# Patient Record
Sex: FEMALE | Race: WHITE | NOT HISPANIC OR LATINO | URBAN - METROPOLITAN AREA
[De-identification: names, ages, dates, MRNs, and addresses within clinical notes are randomized per-mention and may not be internally consistent; named-entity substitution may affect disease eponyms.]

---

## 2018-09-18 ENCOUNTER — OFFICE (OUTPATIENT)
Dept: URBAN - METROPOLITAN AREA CLINIC 19 | Facility: CLINIC | Age: 58
End: 2018-09-18

## 2018-09-18 VITALS
SYSTOLIC BLOOD PRESSURE: 109 MMHG | HEIGHT: 66 IN | WEIGHT: 202 LBS | DIASTOLIC BLOOD PRESSURE: 74 MMHG | HEART RATE: 58 BPM

## 2018-09-18 DIAGNOSIS — R10.812 LEFT UPPER QUADRANT ABDOMINAL TENDERNESS: ICD-10-CM

## 2018-09-18 DIAGNOSIS — E66.9 OBESITY, UNSPECIFIED: ICD-10-CM

## 2018-09-18 DIAGNOSIS — K21.9 GASTRO-ESOPHAGEAL REFLUX DISEASE WITHOUT ESOPHAGITIS: ICD-10-CM

## 2018-09-18 DIAGNOSIS — K59.00 CONSTIPATION, UNSPECIFIED: ICD-10-CM

## 2018-09-18 DIAGNOSIS — R05 COUGH: ICD-10-CM

## 2018-09-18 DIAGNOSIS — R14.0 ABDOMINAL DISTENSION (GASEOUS): ICD-10-CM

## 2018-09-18 DIAGNOSIS — R11.2 NAUSEA WITH VOMITING, UNSPECIFIED: ICD-10-CM

## 2018-09-18 LAB
AMYLASE: 25 U/L — LOW (ref 31–124)
C-REACTIVE PROTEIN, QUANT: 11.4 MG/L — HIGH (ref 0–4.9)
CBC, PLATELET, NO DIFFERENTIAL: HEMATOCRIT: 36.5 % (ref 34–46.6)
CBC, PLATELET, NO DIFFERENTIAL: HEMOGLOBIN: 12.1 G/DL (ref 11.1–15.9)
CBC, PLATELET, NO DIFFERENTIAL: MCH: 27.9 PG (ref 26.6–33)
CBC, PLATELET, NO DIFFERENTIAL: MCHC: 33.2 G/DL (ref 31.5–35.7)
CBC, PLATELET, NO DIFFERENTIAL: MCV: 84 FL (ref 79–97)
CBC, PLATELET, NO DIFFERENTIAL: PLATELETS: 278 X10E3/UL (ref 150–379)
CBC, PLATELET, NO DIFFERENTIAL: RBC: 4.34 X10E6/UL (ref 3.77–5.28)
CBC, PLATELET, NO DIFFERENTIAL: RDW: 14.7 % (ref 12.3–15.4)
CBC, PLATELET, NO DIFFERENTIAL: WBC: 7.8 X10E3/UL (ref 3.4–10.8)
CELIAC DISEASE COMPREHENSIVE: DEAMIDATED GLIADIN ABS, IGA: 4 UNITS (ref 0–19)
CELIAC DISEASE COMPREHENSIVE: DEAMIDATED GLIADIN ABS, IGG: 2 UNITS (ref 0–19)
CELIAC DISEASE COMPREHENSIVE: ENDOMYSIAL ANTIBODY IGA: NEGATIVE
CELIAC DISEASE COMPREHENSIVE: IMMUNOGLOBULIN A, QN, SERUM: 216 MG/DL (ref 87–352)
CELIAC DISEASE COMPREHENSIVE: T-TRANSGLUTAMINASE (TTG) IGA: 3 U/ML (ref 0–3)
CELIAC DISEASE COMPREHENSIVE: T-TRANSGLUTAMINASE (TTG) IGG: <2 U/ML
COMP. METABOLIC PANEL (14): A/G RATIO: 1.7 (ref 1.2–2.2)
COMP. METABOLIC PANEL (14): ALBUMIN: 4.3 G/DL (ref 3.5–5.5)
COMP. METABOLIC PANEL (14): ALKALINE PHOSPHATASE: 81 IU/L (ref 39–117)
COMP. METABOLIC PANEL (14): ALT (SGPT): 27 IU/L (ref 0–32)
COMP. METABOLIC PANEL (14): AST (SGOT): 24 IU/L (ref 0–40)
COMP. METABOLIC PANEL (14): BILIRUBIN, TOTAL: 0.2 MG/DL (ref 0–1.2)
COMP. METABOLIC PANEL (14): BUN/CREATININE RATIO: 17 (ref 9–23)
COMP. METABOLIC PANEL (14): BUN: 15 MG/DL (ref 6–24)
COMP. METABOLIC PANEL (14): CALCIUM: 10.6 MG/DL — HIGH (ref 8.7–10.2)
COMP. METABOLIC PANEL (14): CARBON DIOXIDE, TOTAL: 22 MMOL/L (ref 20–29)
COMP. METABOLIC PANEL (14): CHLORIDE: 104 MMOL/L (ref 96–106)
COMP. METABOLIC PANEL (14): CREATININE: 0.86 MG/DL (ref 0.57–1)
COMP. METABOLIC PANEL (14): EGFR IF AFRICN AM: 87 ML/MIN/1.73 (ref 59–?)
COMP. METABOLIC PANEL (14): EGFR IF NONAFRICN AM: 76 ML/MIN/1.73 (ref 59–?)
COMP. METABOLIC PANEL (14): GLOBULIN, TOTAL: 2.6 G/DL (ref 1.5–4.5)
COMP. METABOLIC PANEL (14): GLUCOSE: 96 MG/DL (ref 65–99)
COMP. METABOLIC PANEL (14): POTASSIUM: 4.4 MMOL/L (ref 3.5–5.2)
COMP. METABOLIC PANEL (14): PROTEIN, TOTAL: 6.9 G/DL (ref 6–8.5)
COMP. METABOLIC PANEL (14): SODIUM: 142 MMOL/L (ref 134–144)
FOOD ALLERGY PROFILE: CLASS DESCRIPTION: (no result)
FOOD ALLERGY PROFILE: F001-IGE EGG WHITE: <0.1 KU/L
FOOD ALLERGY PROFILE: F002-IGE MILK: <0.1 KU/L
FOOD ALLERGY PROFILE: F003-IGE CODFISH: <0.1 KU/L
FOOD ALLERGY PROFILE: F004-IGE WHEAT: <0.1 KU/L
FOOD ALLERGY PROFILE: F008-IGE CORN: <0.1 KU/L
FOOD ALLERGY PROFILE: F010-IGE SESAME SEED: <0.1 KU/L
FOOD ALLERGY PROFILE: F013-IGE PEANUT: <0.1 KU/L
FOOD ALLERGY PROFILE: F014-IGE SOYBEAN: <0.1 KU/L
FOOD ALLERGY PROFILE: F024-IGE SHRIMP: <0.1 KU/L
FOOD ALLERGY PROFILE: F207-IGE CLAM: <0.1 KU/L
FOOD ALLERGY PROFILE: F256-IGE WALNUT: <0.1 KU/L
FOOD ALLERGY PROFILE: F338-IGE SCALLOP: <0.1 KU/L
LIPASE: 15 U/L (ref 14–72)

## 2018-09-18 PROCEDURE — 99204 OFFICE O/P NEW MOD 45 MIN: CPT | Performed by: INTERNAL MEDICINE

## 2018-09-18 RX ORDER — RANITIDINE 150 MG/1
TABLET ORAL
Qty: 60 | Refills: 1 | Status: COMPLETED
Start: 2018-09-18 | End: 2018-10-15

## 2018-10-09 ENCOUNTER — OFFICE (OUTPATIENT)
Dept: URBAN - METROPOLITAN AREA PATHOLOGY 22 | Facility: PATHOLOGY | Age: 58
End: 2018-10-09
Payer: COMMERCIAL

## 2018-10-09 ENCOUNTER — AMBULATORY SURGICAL CENTER (OUTPATIENT)
Dept: URBAN - METROPOLITAN AREA SURGERY 2 | Facility: SURGERY | Age: 58
End: 2018-10-09

## 2018-10-09 ENCOUNTER — AMBULATORY SURGICAL CENTER (OUTPATIENT)
Dept: URBAN - METROPOLITAN AREA SURGERY 2 | Facility: SURGERY | Age: 58
End: 2018-10-09
Payer: COMMERCIAL

## 2018-10-09 VITALS
HEART RATE: 53 BPM | HEART RATE: 59 BPM | SYSTOLIC BLOOD PRESSURE: 92 MMHG | HEIGHT: 66 IN | RESPIRATION RATE: 16 BRPM | DIASTOLIC BLOOD PRESSURE: 57 MMHG | HEART RATE: 56 BPM | DIASTOLIC BLOOD PRESSURE: 61 MMHG | HEIGHT: 66 IN | TEMPERATURE: 97.6 F | TEMPERATURE: 98 F | HEART RATE: 56 BPM | DIASTOLIC BLOOD PRESSURE: 53 MMHG | OXYGEN SATURATION: 97 % | DIASTOLIC BLOOD PRESSURE: 57 MMHG | HEART RATE: 51 BPM | SYSTOLIC BLOOD PRESSURE: 92 MMHG | DIASTOLIC BLOOD PRESSURE: 45 MMHG | RESPIRATION RATE: 18 BRPM | DIASTOLIC BLOOD PRESSURE: 53 MMHG | OXYGEN SATURATION: 96 % | OXYGEN SATURATION: 97 % | SYSTOLIC BLOOD PRESSURE: 99 MMHG | WEIGHT: 200 LBS | WEIGHT: 200 LBS | DIASTOLIC BLOOD PRESSURE: 61 MMHG | SYSTOLIC BLOOD PRESSURE: 95 MMHG | HEART RATE: 59 BPM | TEMPERATURE: 97.6 F | SYSTOLIC BLOOD PRESSURE: 99 MMHG | OXYGEN SATURATION: 96 % | HEART RATE: 51 BPM | DIASTOLIC BLOOD PRESSURE: 45 MMHG | TEMPERATURE: 98 F | RESPIRATION RATE: 16 BRPM | SYSTOLIC BLOOD PRESSURE: 95 MMHG | RESPIRATION RATE: 18 BRPM | HEART RATE: 53 BPM

## 2018-10-09 DIAGNOSIS — K22.8 OTHER SPECIFIED DISEASES OF ESOPHAGUS: ICD-10-CM

## 2018-10-09 DIAGNOSIS — R11.2 NAUSEA WITH VOMITING, UNSPECIFIED: ICD-10-CM

## 2018-10-09 DIAGNOSIS — K21.0 GASTRO-ESOPHAGEAL REFLUX DISEASE WITH ESOPHAGITIS: ICD-10-CM

## 2018-10-09 DIAGNOSIS — K29.50 UNSPECIFIED CHRONIC GASTRITIS WITHOUT BLEEDING: ICD-10-CM

## 2018-10-09 DIAGNOSIS — K20.9 ESOPHAGITIS, UNSPECIFIED: ICD-10-CM

## 2018-10-09 DIAGNOSIS — K44.9 DIAPHRAGMATIC HERNIA WITHOUT OBSTRUCTION OR GANGRENE: ICD-10-CM

## 2018-10-09 DIAGNOSIS — K22.10 ULCER OF ESOPHAGUS WITHOUT BLEEDING: ICD-10-CM

## 2018-10-09 DIAGNOSIS — R14.0 ABDOMINAL DISTENSION (GASEOUS): ICD-10-CM

## 2018-10-09 PROBLEM — K31.89 OTHER DISEASES OF STOMACH AND DUODENUM: Status: ACTIVE | Noted: 2018-10-09

## 2018-10-09 PROCEDURE — 88342 IMHCHEM/IMCYTCHM 1ST ANTB: CPT | Performed by: INTERNAL MEDICINE

## 2018-10-09 PROCEDURE — 88341 IMHCHEM/IMCYTCHM EA ADD ANTB: CPT | Performed by: INTERNAL MEDICINE

## 2018-10-09 PROCEDURE — 88312 SPECIAL STAINS GROUP 1: CPT | Performed by: INTERNAL MEDICINE

## 2018-10-09 PROCEDURE — 43239 EGD BIOPSY SINGLE/MULTIPLE: CPT | Performed by: INTERNAL MEDICINE

## 2018-10-09 PROCEDURE — 88305 TISSUE EXAM BY PATHOLOGIST: CPT | Performed by: INTERNAL MEDICINE

## 2018-10-09 PROCEDURE — 88313 SPECIAL STAINS GROUP 2: CPT | Performed by: INTERNAL MEDICINE

## 2019-02-12 ENCOUNTER — OFFICE (OUTPATIENT)
Dept: URBAN - METROPOLITAN AREA CLINIC 19 | Facility: CLINIC | Age: 59
End: 2019-02-12

## 2019-02-12 VITALS
DIASTOLIC BLOOD PRESSURE: 75 MMHG | WEIGHT: 200 LBS | HEIGHT: 66 IN | SYSTOLIC BLOOD PRESSURE: 111 MMHG | HEART RATE: 68 BPM

## 2019-02-12 DIAGNOSIS — K44.9 DIAPHRAGMATIC HERNIA WITHOUT OBSTRUCTION OR GANGRENE: ICD-10-CM

## 2019-02-12 DIAGNOSIS — K21.9 GASTRO-ESOPHAGEAL REFLUX DISEASE WITHOUT ESOPHAGITIS: ICD-10-CM

## 2019-02-12 DIAGNOSIS — K20.9 ESOPHAGITIS, UNSPECIFIED: ICD-10-CM

## 2019-02-12 DIAGNOSIS — R11.0 NAUSEA: ICD-10-CM

## 2019-02-12 DIAGNOSIS — R14.0 ABDOMINAL DISTENSION (GASEOUS): ICD-10-CM

## 2019-02-12 DIAGNOSIS — K59.00 CONSTIPATION, UNSPECIFIED: ICD-10-CM

## 2019-02-12 PROCEDURE — 99214 OFFICE O/P EST MOD 30 MIN: CPT | Performed by: INTERNAL MEDICINE

## 2019-02-12 RX ORDER — PANTOPRAZOLE SODIUM 20 MG/1
TABLET, DELAYED RELEASE ORAL
Qty: 60 | Refills: 3 | Status: COMPLETED
Start: 2018-10-15 | End: 2019-02-12

## 2019-02-12 RX ORDER — OMEPRAZOLE 40 MG/1
CAPSULE, DELAYED RELEASE ORAL
Qty: 60 | Refills: 5 | Status: ACTIVE
Start: 2019-02-12

## 2019-03-25 ENCOUNTER — OFFICE (OUTPATIENT)
Dept: URBAN - METROPOLITAN AREA CLINIC 14 | Facility: CLINIC | Age: 59
End: 2019-03-25

## 2019-03-25 VITALS
SYSTOLIC BLOOD PRESSURE: 113 MMHG | HEART RATE: 63 BPM | DIASTOLIC BLOOD PRESSURE: 65 MMHG | WEIGHT: 203 LBS | HEIGHT: 66 IN

## 2019-03-25 DIAGNOSIS — K44.9 DIAPHRAGMATIC HERNIA WITHOUT OBSTRUCTION OR GANGRENE: ICD-10-CM

## 2019-03-25 DIAGNOSIS — R63.5 ABNORMAL WEIGHT GAIN: ICD-10-CM

## 2019-03-25 DIAGNOSIS — K21.9 GASTRO-ESOPHAGEAL REFLUX DISEASE WITHOUT ESOPHAGITIS: ICD-10-CM

## 2019-03-25 PROCEDURE — 99214 OFFICE O/P EST MOD 30 MIN: CPT | Performed by: INTERNAL MEDICINE

## 2019-03-25 RX ORDER — RANITIDINE 150 MG/1
300 TABLET ORAL
Qty: 60 | Refills: 5 | Status: COMPLETED
Start: 2019-03-25 | End: 2021-06-14

## 2021-06-14 ENCOUNTER — OFFICE (OUTPATIENT)
Dept: URBAN - METROPOLITAN AREA CLINIC 19 | Facility: CLINIC | Age: 61
End: 2021-06-14

## 2021-06-14 VITALS
HEIGHT: 66 IN | SYSTOLIC BLOOD PRESSURE: 119 MMHG | WEIGHT: 203 LBS | DIASTOLIC BLOOD PRESSURE: 74 MMHG | HEART RATE: 65 BPM | OXYGEN SATURATION: 98 %

## 2021-06-14 DIAGNOSIS — K62.5 HEMORRHAGE OF ANUS AND RECTUM: ICD-10-CM

## 2021-06-14 DIAGNOSIS — K62.89 OTHER SPECIFIED DISEASES OF ANUS AND RECTUM: ICD-10-CM

## 2021-06-14 DIAGNOSIS — R10.13 EPIGASTRIC PAIN: ICD-10-CM

## 2021-06-14 DIAGNOSIS — K64.9 UNSPECIFIED HEMORRHOIDS: ICD-10-CM

## 2021-06-14 DIAGNOSIS — Z83.71 FAMILY HISTORY OF COLONIC POLYPS: ICD-10-CM

## 2021-06-14 PROCEDURE — 99214 OFFICE O/P EST MOD 30 MIN: CPT

## 2021-06-14 RX ORDER — LIDOCAINE 5 G/100G
OINTMENT TOPICAL
Qty: 30 | Refills: 1 | Status: COMPLETED
End: 2022-05-31

## 2021-06-14 RX ORDER — HYDROCORTISONE ACETATE 25 MG/1
50 SUPPOSITORY RECTAL
Qty: 14 | Refills: 1 | Status: COMPLETED
End: 2022-05-31

## 2021-06-14 RX ORDER — SODIUM PICOSULFATE, MAGNESIUM OXIDE, AND ANHYDROUS CITRIC ACID 10; 3.5; 12 MG/160ML; G/160ML; G/160ML
LIQUID ORAL
Qty: 1 | Refills: 0 | Status: COMPLETED
Start: 2021-06-14 | End: 2021-08-09

## 2021-06-14 NOTE — SERVICEHPINOTES
60-year-old  white female previously seen by Lexie Stahl MD here for complaints of symptomatic hemorrhoids, rectal bleeding and rectal pain.She was last seen for chronic reflux and heartburn issues.  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  Biopsies were negative for H pylori and celiac disease. She tried multiple PPIs without significant relief.  However, she has changed her diet and utilizes anti reflux measures, especially not lying down 2-3 hours after eating.  She is actually doing well off any medication.Over the last several weeks she has had which she believes are symptomatic hemorrhoids with occasional rectal bleeding with bowel movements and rectal itching and pain.  Sometimes the rectal pain while actually wake her up from sleep at night.  She was evaluated by a provider on Tele[facility] provided by her insurance who gave her prescription for Anucort and lidocaine and encouraged her to follow-up with GI.  These have actually significantly helped her symptoms.  She denies any significant constipation, but recently started a probiotic which seems to keep her regular.  Occasionally, she will spend a longer period of time on the toilet bowl, but denies significant straining.  She denies significant reflux at this time, but does report epigastric pain whenever she tries to eat or drink anything cold.  She denies abdominal pain, nausea or vomiting.She last had a colonoscopy 8/23/11 which found hemorrhoids but was otherwise unremarkable.  Her daughter was apparently recently diagnosed with Crohn's disease at age 40.  Her mother had colon polyps at age 60.

## 2021-06-14 NOTE — SERVICENOTES
The patient's assessment was reviewed with Dr. Talobt and a collaborative plan of care was established.

## 2021-08-09 ENCOUNTER — OFFICE (OUTPATIENT)
Dept: URBAN - METROPOLITAN AREA CLINIC 19 | Facility: CLINIC | Age: 61
End: 2021-08-09

## 2021-08-09 VITALS
WEIGHT: 202 LBS | DIASTOLIC BLOOD PRESSURE: 78 MMHG | SYSTOLIC BLOOD PRESSURE: 116 MMHG | HEART RATE: 62 BPM | HEIGHT: 66 IN | OXYGEN SATURATION: 99 %

## 2021-08-09 DIAGNOSIS — R93.3 ABNORMAL FINDINGS ON DIAGNOSTIC IMAGING OF OTHER PARTS OF DI: ICD-10-CM

## 2021-08-09 DIAGNOSIS — R10.13 EPIGASTRIC PAIN: ICD-10-CM

## 2021-08-09 DIAGNOSIS — Z83.71 FAMILY HISTORY OF COLONIC POLYPS: ICD-10-CM

## 2021-08-09 DIAGNOSIS — K21.9 GASTRO-ESOPHAGEAL REFLUX DISEASE WITHOUT ESOPHAGITIS: ICD-10-CM

## 2021-08-09 DIAGNOSIS — K62.5 HEMORRHAGE OF ANUS AND RECTUM: ICD-10-CM

## 2021-08-09 DIAGNOSIS — R05 COUGH: ICD-10-CM

## 2021-08-09 PROCEDURE — 99214 OFFICE O/P EST MOD 30 MIN: CPT

## 2021-08-09 RX ORDER — FAMOTIDINE 40 MG/1
40 TABLET, FILM COATED ORAL
Qty: 30 | Refills: 11 | Status: COMPLETED
Start: 2021-08-09 | End: 2021-09-13

## 2021-08-09 NOTE — SERVICEHPINOTES
60-year-old  white female returns to discuss an abnormal barium swallow study and worsening reflux.I last saw her on 6/14/21 for complaints of symptomatic hemorrhoids, rectal bleeding and rectal pain. I gave her lidocaine ointment and Anucort suppositories for the symptoms.  I also ordered an upper and lower scope, she was due for screening colonoscopy and was also complaining of epigastric pain when she would eat or drink “cold things.”  She was off the PPI at that point as she has had issues with a mild rash whenever she takes omeprazole or other PPIs.Since being seen in June, her rectal bleeding and rectal pain has significantly improved with ointment and suppositories.  She only uses the suppositories as needed.  She has been evaluated by a pulmonologist, Lorenzo Springer MD for chronic cough that she has.  She apparently had a chest x-ray as well as a barium swallow study with Dr. Springer.  Apparently the barium swallow study was abnormal and an EGD was recommended (records requested).  She continues to have reflux symptoms, but denies taking any other antacids then Tums.  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 Biopsies were negative for H pylori and celiac disease. She has tried multiple PPIs without significant relief and reports experiencing a facial rash to Omeprazole. She does report epigastric pain whenever she tries to eat or drink anything cold. She denies any significant constipation, but recently started a probiotic which seems to keep her regular. Over the last several weeks she has had which she believes are symptomatic hemorrhoids with occasional rectal bleeding with bowel movements and rectal itching and pain. Sometimes the rectal pain while actually wake her up from sleep at night. She was evaluated by a provider on [employer] provided by her insurance who gave her prescription for Anucort and lidocaine and encouraged her to follow-up with GI. These have actually significantly helped her symptoms. She last had a colonoscopy 8/23/11 which found hemorrhoids but was otherwise unremarkable. Her daughter was apparently recently diagnosed with Crohn's disease at age 40. Her mother had colon polyps at age 60.

## 2021-08-09 NOTE — SERVICENOTES
The patient's assessment was reviewed with Dr. Talbot and a collaborative plan of care was established.

## 2021-09-13 ENCOUNTER — AMBULATORY SURGICAL CENTER (OUTPATIENT)
Dept: URBAN - METROPOLITAN AREA SURGERY 2 | Facility: SURGERY | Age: 61
End: 2021-09-13
Payer: COMMERCIAL

## 2021-09-13 ENCOUNTER — OFFICE (OUTPATIENT)
Dept: URBAN - METROPOLITAN AREA PATHOLOGY 22 | Facility: PATHOLOGY | Age: 61
End: 2021-09-13
Payer: COMMERCIAL

## 2021-09-13 VITALS
HEART RATE: 71 BPM | SYSTOLIC BLOOD PRESSURE: 118 MMHG | RESPIRATION RATE: 17 BRPM | HEART RATE: 71 BPM | RESPIRATION RATE: 17 BRPM | SYSTOLIC BLOOD PRESSURE: 119 MMHG | SYSTOLIC BLOOD PRESSURE: 108 MMHG | TEMPERATURE: 97.8 F | DIASTOLIC BLOOD PRESSURE: 59 MMHG | SYSTOLIC BLOOD PRESSURE: 129 MMHG | OXYGEN SATURATION: 93 % | DIASTOLIC BLOOD PRESSURE: 81 MMHG | TEMPERATURE: 97.8 F | OXYGEN SATURATION: 93 % | SYSTOLIC BLOOD PRESSURE: 115 MMHG | OXYGEN SATURATION: 95 % | HEART RATE: 70 BPM | WEIGHT: 200 LBS | RESPIRATION RATE: 16 BRPM | TEMPERATURE: 97.2 F | DIASTOLIC BLOOD PRESSURE: 73 MMHG | OXYGEN SATURATION: 95 % | SYSTOLIC BLOOD PRESSURE: 118 MMHG | DIASTOLIC BLOOD PRESSURE: 84 MMHG | HEIGHT: 66 IN | HEART RATE: 68 BPM | HEIGHT: 66 IN | HEIGHT: 66 IN | WEIGHT: 200 LBS | RESPIRATION RATE: 16 BRPM | DIASTOLIC BLOOD PRESSURE: 59 MMHG | SYSTOLIC BLOOD PRESSURE: 131 MMHG | SYSTOLIC BLOOD PRESSURE: 131 MMHG | DIASTOLIC BLOOD PRESSURE: 73 MMHG | DIASTOLIC BLOOD PRESSURE: 73 MMHG | DIASTOLIC BLOOD PRESSURE: 81 MMHG | HEART RATE: 70 BPM | SYSTOLIC BLOOD PRESSURE: 108 MMHG | SYSTOLIC BLOOD PRESSURE: 108 MMHG | OXYGEN SATURATION: 96 % | OXYGEN SATURATION: 96 % | HEART RATE: 68 BPM | SYSTOLIC BLOOD PRESSURE: 119 MMHG | SYSTOLIC BLOOD PRESSURE: 115 MMHG | HEART RATE: 62 BPM | SYSTOLIC BLOOD PRESSURE: 119 MMHG | SYSTOLIC BLOOD PRESSURE: 118 MMHG | HEART RATE: 70 BPM | OXYGEN SATURATION: 95 % | OXYGEN SATURATION: 96 % | HEART RATE: 63 BPM | TEMPERATURE: 97.8 F | SYSTOLIC BLOOD PRESSURE: 129 MMHG | DIASTOLIC BLOOD PRESSURE: 62 MMHG | SYSTOLIC BLOOD PRESSURE: 129 MMHG | HEART RATE: 71 BPM | DIASTOLIC BLOOD PRESSURE: 62 MMHG | OXYGEN SATURATION: 93 % | HEART RATE: 63 BPM | SYSTOLIC BLOOD PRESSURE: 115 MMHG | HEART RATE: 63 BPM | HEART RATE: 62 BPM | DIASTOLIC BLOOD PRESSURE: 84 MMHG | DIASTOLIC BLOOD PRESSURE: 81 MMHG | SYSTOLIC BLOOD PRESSURE: 131 MMHG | DIASTOLIC BLOOD PRESSURE: 62 MMHG | TEMPERATURE: 97.2 F | TEMPERATURE: 97.2 F | RESPIRATION RATE: 16 BRPM | DIASTOLIC BLOOD PRESSURE: 59 MMHG | HEART RATE: 62 BPM | HEART RATE: 68 BPM | RESPIRATION RATE: 17 BRPM | DIASTOLIC BLOOD PRESSURE: 84 MMHG | WEIGHT: 200 LBS

## 2021-09-13 DIAGNOSIS — K31.7 POLYP OF STOMACH AND DUODENUM: ICD-10-CM

## 2021-09-13 DIAGNOSIS — K22.70 BARRETT'S ESOPHAGUS WITHOUT DYSPLASIA: ICD-10-CM

## 2021-09-13 DIAGNOSIS — R93.3 ABNORMAL FINDINGS ON DIAGNOSTIC IMAGING OF OTHER PARTS OF DI: ICD-10-CM

## 2021-09-13 DIAGNOSIS — K57.30 DIVERTICULOSIS OF LARGE INTESTINE WITHOUT PERFORATION OR ABS: ICD-10-CM

## 2021-09-13 DIAGNOSIS — K29.50 UNSPECIFIED CHRONIC GASTRITIS WITHOUT BLEEDING: ICD-10-CM

## 2021-09-13 DIAGNOSIS — R10.13 EPIGASTRIC PAIN: ICD-10-CM

## 2021-09-13 DIAGNOSIS — Z12.11 ENCOUNTER FOR SCREENING FOR MALIGNANT NEOPLASM OF COLON: ICD-10-CM

## 2021-09-13 DIAGNOSIS — K44.9 DIAPHRAGMATIC HERNIA WITHOUT OBSTRUCTION OR GANGRENE: ICD-10-CM

## 2021-09-13 DIAGNOSIS — K21.9 GASTRO-ESOPHAGEAL REFLUX DISEASE WITHOUT ESOPHAGITIS: ICD-10-CM

## 2021-09-13 PROBLEM — K20.90 ESOPHAGITIS, UNSPECIFIED WITHOUT BLEEDING: Status: ACTIVE | Noted: 2021-09-13

## 2021-09-13 PROBLEM — K31.89 OTHER DISEASES OF STOMACH AND DUODENUM: Status: ACTIVE | Noted: 2021-09-13

## 2021-09-13 PROBLEM — K20.80 OTHER ESOPHAGITIS WITHOUT BLEEDING: Status: ACTIVE | Noted: 2021-09-13

## 2021-09-13 PROCEDURE — 88313 SPECIAL STAINS GROUP 2: CPT | Performed by: INTERNAL MEDICINE

## 2021-09-13 PROCEDURE — G0121 COLON CA SCRN NOT HI RSK IND: HCPCS | Performed by: INTERNAL MEDICINE

## 2021-09-13 PROCEDURE — 88305 TISSUE EXAM BY PATHOLOGIST: CPT | Performed by: INTERNAL MEDICINE

## 2021-09-13 PROCEDURE — 43239 EGD BIOPSY SINGLE/MULTIPLE: CPT | Mod: 59 | Performed by: INTERNAL MEDICINE

## 2021-09-13 PROCEDURE — 43251 EGD REMOVE LESION SNARE: CPT | Mod: 51 | Performed by: INTERNAL MEDICINE

## 2021-09-13 PROCEDURE — 88342 IMHCHEM/IMCYTCHM 1ST ANTB: CPT | Mod: 59 | Performed by: INTERNAL MEDICINE

## 2021-09-13 PROCEDURE — 88341 IMHCHEM/IMCYTCHM EA ADD ANTB: CPT | Performed by: INTERNAL MEDICINE

## 2022-05-31 ENCOUNTER — OFFICE (OUTPATIENT)
Dept: URBAN - METROPOLITAN AREA CLINIC 19 | Facility: CLINIC | Age: 62
End: 2022-05-31

## 2022-05-31 VITALS
DIASTOLIC BLOOD PRESSURE: 70 MMHG | OXYGEN SATURATION: 98 % | HEIGHT: 66 IN | HEART RATE: 71 BPM | SYSTOLIC BLOOD PRESSURE: 117 MMHG | WEIGHT: 200 LBS

## 2022-05-31 DIAGNOSIS — Z83.71 FAMILY HISTORY OF COLONIC POLYPS: ICD-10-CM

## 2022-05-31 DIAGNOSIS — K31.7 POLYP OF STOMACH AND DUODENUM: ICD-10-CM

## 2022-05-31 DIAGNOSIS — K21.9 GASTRO-ESOPHAGEAL REFLUX DISEASE WITHOUT ESOPHAGITIS: ICD-10-CM

## 2022-05-31 PROCEDURE — 99213 OFFICE O/P EST LOW 20 MIN: CPT | Performed by: INTERNAL MEDICINE

## 2022-05-31 RX ORDER — FAMOTIDINE 40 MG/1
40 TABLET, FILM COATED ORAL
Qty: 90 | Refills: 3 | Status: ACTIVE
Start: 2021-09-20

## 2022-08-03 ENCOUNTER — AMBULATORY SURGICAL CENTER (OUTPATIENT)
Dept: URBAN - METROPOLITAN AREA SURGERY 3 | Facility: SURGERY | Age: 62
End: 2022-08-03

## 2022-08-03 ENCOUNTER — AMBULATORY SURGICAL CENTER (OUTPATIENT)
Dept: URBAN - METROPOLITAN AREA SURGERY 3 | Facility: SURGERY | Age: 62
End: 2022-08-03
Payer: COMMERCIAL

## 2022-08-03 VITALS
SYSTOLIC BLOOD PRESSURE: 124 MMHG | RESPIRATION RATE: 18 BRPM | RESPIRATION RATE: 19 BRPM | SYSTOLIC BLOOD PRESSURE: 104 MMHG | HEART RATE: 64 BPM | OXYGEN SATURATION: 93 % | DIASTOLIC BLOOD PRESSURE: 62 MMHG | TEMPERATURE: 97.5 F | DIASTOLIC BLOOD PRESSURE: 79 MMHG | OXYGEN SATURATION: 96 % | OXYGEN SATURATION: 96 % | DIASTOLIC BLOOD PRESSURE: 73 MMHG | RESPIRATION RATE: 19 BRPM | HEIGHT: 66 IN | OXYGEN SATURATION: 95 % | OXYGEN SATURATION: 94 % | SYSTOLIC BLOOD PRESSURE: 106 MMHG | RESPIRATION RATE: 18 BRPM | SYSTOLIC BLOOD PRESSURE: 109 MMHG | HEART RATE: 66 BPM | SYSTOLIC BLOOD PRESSURE: 105 MMHG | DIASTOLIC BLOOD PRESSURE: 73 MMHG | DIASTOLIC BLOOD PRESSURE: 62 MMHG | HEART RATE: 62 BPM | RESPIRATION RATE: 20 BRPM | DIASTOLIC BLOOD PRESSURE: 79 MMHG | HEART RATE: 66 BPM | RESPIRATION RATE: 20 BRPM | RESPIRATION RATE: 16 BRPM | TEMPERATURE: 97.5 F | HEART RATE: 65 BPM | SYSTOLIC BLOOD PRESSURE: 104 MMHG | HEART RATE: 62 BPM | DIASTOLIC BLOOD PRESSURE: 68 MMHG | SYSTOLIC BLOOD PRESSURE: 106 MMHG | RESPIRATION RATE: 19 BRPM | HEART RATE: 60 BPM | RESPIRATION RATE: 16 BRPM | OXYGEN SATURATION: 93 % | TEMPERATURE: 97.5 F | HEART RATE: 64 BPM | HEART RATE: 66 BPM | SYSTOLIC BLOOD PRESSURE: 105 MMHG | DIASTOLIC BLOOD PRESSURE: 69 MMHG | RESPIRATION RATE: 18 BRPM | TEMPERATURE: 97.2 F | DIASTOLIC BLOOD PRESSURE: 68 MMHG | DIASTOLIC BLOOD PRESSURE: 79 MMHG | OXYGEN SATURATION: 95 % | DIASTOLIC BLOOD PRESSURE: 69 MMHG | OXYGEN SATURATION: 94 % | HEART RATE: 64 BPM | OXYGEN SATURATION: 94 % | TEMPERATURE: 97.2 F | SYSTOLIC BLOOD PRESSURE: 109 MMHG | SYSTOLIC BLOOD PRESSURE: 109 MMHG | HEART RATE: 60 BPM | RESPIRATION RATE: 20 BRPM | OXYGEN SATURATION: 96 % | RESPIRATION RATE: 16 BRPM | HEART RATE: 60 BPM | HEIGHT: 66 IN | SYSTOLIC BLOOD PRESSURE: 106 MMHG | SYSTOLIC BLOOD PRESSURE: 124 MMHG | HEART RATE: 65 BPM | WEIGHT: 200 LBS | HEART RATE: 65 BPM | OXYGEN SATURATION: 95 % | DIASTOLIC BLOOD PRESSURE: 62 MMHG | DIASTOLIC BLOOD PRESSURE: 68 MMHG | TEMPERATURE: 97.2 F | DIASTOLIC BLOOD PRESSURE: 69 MMHG | HEART RATE: 62 BPM | DIASTOLIC BLOOD PRESSURE: 73 MMHG | WEIGHT: 200 LBS | SYSTOLIC BLOOD PRESSURE: 105 MMHG | WEIGHT: 200 LBS | SYSTOLIC BLOOD PRESSURE: 104 MMHG | HEIGHT: 66 IN | SYSTOLIC BLOOD PRESSURE: 124 MMHG | OXYGEN SATURATION: 93 %

## 2022-08-03 DIAGNOSIS — K21.00 GASTRO-ESOPHAGEAL REFLUX DISEASE WITH ESOPHAGITIS, WITHOUT B: ICD-10-CM

## 2022-08-03 DIAGNOSIS — K44.9 DIAPHRAGMATIC HERNIA WITHOUT OBSTRUCTION OR GANGRENE: ICD-10-CM

## 2022-08-03 PROBLEM — K21.9 CHRONIC GERD: Status: ACTIVE | Noted: 2022-08-03

## 2022-08-03 PROCEDURE — 43235 EGD DIAGNOSTIC BRUSH WASH: CPT | Performed by: INTERNAL MEDICINE

## 2023-09-06 ENCOUNTER — OFFICE (OUTPATIENT)
Dept: URBAN - METROPOLITAN AREA CLINIC 19 | Facility: CLINIC | Age: 63
End: 2023-09-06

## 2023-09-06 VITALS
WEIGHT: 184 LBS | OXYGEN SATURATION: 99 % | DIASTOLIC BLOOD PRESSURE: 83 MMHG | HEART RATE: 72 BPM | SYSTOLIC BLOOD PRESSURE: 121 MMHG | HEIGHT: 66 IN

## 2023-09-06 DIAGNOSIS — K21.9 GASTRO-ESOPHAGEAL REFLUX DISEASE WITHOUT ESOPHAGITIS: ICD-10-CM

## 2023-09-06 DIAGNOSIS — K59.00 CONSTIPATION, UNSPECIFIED: ICD-10-CM

## 2023-09-06 DIAGNOSIS — Z83.71 FAMILY HISTORY OF COLONIC POLYPS: ICD-10-CM

## 2023-09-06 DIAGNOSIS — K31.7 POLYP OF STOMACH AND DUODENUM: ICD-10-CM

## 2023-09-06 PROCEDURE — 99214 OFFICE O/P EST MOD 30 MIN: CPT

## 2023-09-06 RX ORDER — FAMOTIDINE 40 MG/1
40 TABLET, FILM COATED ORAL
Qty: 90 | Refills: 3 | Status: ACTIVE
Start: 2021-09-20

## 2023-09-06 NOTE — SERVICEHPINOTES
63-year-old white female returns for routine follow-up of her GERD.
br
br   She denies any interval history since she was here a year ago for her EGD 8/3/22 which found mild reflux esophagitis, large hiatal hernia and no residual gastric polyp seen.
br
br   Her reflux seems to be well managed on 40 mg of Pepcid daily.  She has tried PPIs in the past but develops a skin rash each time she takes 1. she denies dysphagia, nausea, vomiting, abdominal pain or overt GI bleeding.  She has chronic constipation and takes an occasional MiraLax which seems to help.br
brEGD/colonoscopy 9/13/21 found esophagitis, gastritis, duodenitis, diverticulosis coli and a 2 cm gastric polyp which was removed with electrocautery snare and  proved to be a juvenile polyp with low-grade dysplasia.  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 biopsies were negative for H pylori and celiac. Barium study done outside concerning for a 2cm pedunculated gastric lesion. 
br
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 Biopsies were negative for H pylori and celiac disease.  Colonoscopy 8/23/11 found hemorrhoids but was otherwise unremarkable.Her daughter was apparently recently diagnosed with Crohn's disease at age 40. Her mother had colon polyps at age 60.